# Patient Record
Sex: FEMALE | Race: BLACK OR AFRICAN AMERICAN | NOT HISPANIC OR LATINO | Employment: OTHER | URBAN - METROPOLITAN AREA
[De-identification: names, ages, dates, MRNs, and addresses within clinical notes are randomized per-mention and may not be internally consistent; named-entity substitution may affect disease eponyms.]

---

## 2017-01-16 ENCOUNTER — HISTORICAL (OUTPATIENT)
Dept: ADMINISTRATIVE | Facility: HOSPITAL | Age: 57
End: 2017-01-16

## 2017-03-31 ENCOUNTER — HISTORICAL (OUTPATIENT)
Dept: LAB | Facility: HOSPITAL | Age: 57
End: 2017-03-31

## 2018-01-23 ENCOUNTER — HISTORICAL (OUTPATIENT)
Dept: ADMINISTRATIVE | Facility: HOSPITAL | Age: 58
End: 2018-01-23

## 2018-04-05 ENCOUNTER — HISTORICAL (OUTPATIENT)
Dept: LAB | Facility: HOSPITAL | Age: 58
End: 2018-04-05

## 2018-04-05 LAB
ABS NEUT (OLG): 4.33 X10(3)/MCL (ref 2.1–9.2)
ALBUMIN SERPL-MCNC: 3.7 GM/DL (ref 3.4–5)
ALBUMIN/GLOB SERPL: 1 {RATIO}
ALP SERPL-CCNC: 71 UNIT/L (ref 45–117)
ALT SERPL-CCNC: 26 UNIT/L (ref 13–56)
APPEARANCE, UA: CLEAR
AST SERPL-CCNC: 11 UNIT/L (ref 15–37)
BILIRUB SERPL-MCNC: 0.2 MG/DL (ref 0.2–1)
BILIRUB UR QL STRIP: NEGATIVE
BILIRUBIN DIRECT+TOT PNL SERPL-MCNC: <0.1 MG/DL (ref 0–0.2)
BILIRUBIN DIRECT+TOT PNL SERPL-MCNC: >0.1 MG/DL (ref 0–1)
BUN SERPL-MCNC: 10 MG/DL (ref 7–18)
CALCIUM SERPL-MCNC: 8.5 MG/DL (ref 8.5–10.1)
CHLORIDE SERPL-SCNC: 109 MMOL/L (ref 98–107)
CHOLEST SERPL-MCNC: 198 MG/DL (ref 0–200)
CHOLEST/HDLC SERPL: 3.5 {RATIO} (ref 0–4)
CO2 SERPL-SCNC: 29 MMOL/L (ref 21–32)
COLOR UR: YELLOW
CREAT SERPL-MCNC: 0.81 MG/DL (ref 0.55–1.02)
ERYTHROCYTE [DISTWIDTH] IN BLOOD BY AUTOMATED COUNT: 12.6 % (ref 11.5–17)
GLOBULIN SER-MCNC: 4 GM/DL (ref 2–4)
GLUCOSE (UA): NEGATIVE
GLUCOSE SERPL-MCNC: 103 MG/DL (ref 74–106)
HCT VFR BLD AUTO: 40.7 % (ref 37–47)
HDLC SERPL-MCNC: 57 MG/DL (ref 35–60)
HGB BLD-MCNC: 13.3 GM/DL (ref 12–16)
HGB UR QL STRIP: NEGATIVE
KETONES UR QL STRIP: NEGATIVE
LDLC SERPL CALC-MCNC: 115 MG/DL (ref 0–129)
LEUKOCYTE ESTERASE UR QL STRIP: NEGATIVE
MCH RBC QN AUTO: 31.9 PG (ref 27–31)
MCHC RBC AUTO-ENTMCNC: 32.7 GM/DL (ref 33–36)
MCV RBC AUTO: 97.6 FL (ref 80–94)
NITRITE UR QL STRIP: NEGATIVE
PH UR STRIP: 5.5 [PH] (ref 5–7)
PLATELET # BLD AUTO: 295 X10(3)/MCL (ref 130–400)
PMV BLD AUTO: 9.7 FL (ref 7.4–10.4)
POTASSIUM SERPL-SCNC: 4.2 MMOL/L (ref 3.5–5.1)
PROT SERPL-MCNC: 7.3 GM/DL (ref 6.4–8.2)
PROT UR QL STRIP: NEGATIVE
RBC # BLD AUTO: 4.17 X10(6)/MCL (ref 4.2–5.4)
SODIUM SERPL-SCNC: 144 MMOL/L (ref 136–145)
SP GR UR STRIP: >=1.03 (ref 1–1.03)
TRIGL SERPL-MCNC: 132 MG/DL (ref 30–150)
TSH SERPL-ACNC: 1.8 MIU/ML (ref 0.36–3.74)
UROBILINOGEN UR STRIP-ACNC: NEGATIVE
VLDLC SERPL CALC-MCNC: 26 MG/DL
WBC # SPEC AUTO: 8.2 X10(3)/MCL (ref 4.5–11.5)

## 2018-04-24 ENCOUNTER — HISTORICAL (OUTPATIENT)
Dept: ADMINISTRATIVE | Facility: HOSPITAL | Age: 58
End: 2018-04-24

## 2022-04-10 ENCOUNTER — HISTORICAL (OUTPATIENT)
Dept: ADMINISTRATIVE | Facility: HOSPITAL | Age: 62
End: 2022-04-10

## 2022-04-30 VITALS
WEIGHT: 147.69 LBS | SYSTOLIC BLOOD PRESSURE: 127 MMHG | WEIGHT: 147.69 LBS | HEIGHT: 66 IN | DIASTOLIC BLOOD PRESSURE: 81 MMHG | BODY MASS INDEX: 23.74 KG/M2 | BODY MASS INDEX: 27.58 KG/M2 | WEIGHT: 171.63 LBS | HEIGHT: 66 IN | BODY MASS INDEX: 23.74 KG/M2 | HEIGHT: 66 IN

## 2022-05-02 NOTE — HISTORICAL [facility] CERNER
This is a historical note converted from [employer]. Formatting and pictures may have been removed.  Please reference [employer] for original formatting and attached multimedia. 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 NO INJURY. PAIN TODAY 10/10. PT. REPORTS THAT SHE STILL HAS PAIN TO RIGHT FOOT. DENIES OF ANY PAIN TODAY.....SB  History of Present Illness  This is a 57-year-old female that saw my previous partner?and she comes in today with left shoulder pain. 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 ?She has a lot of pain with?doing things at work. ?This is moderate to severe.? The pain is over the anterolateral shoulder.  Review of Systems  ????GENERAL: No fevers, chills, unexpected weight loss or gain  ????MUSCULOSKELETAL: Joint pain, extremity pain  Physical Exam  Vitals & Measurements  HR:?100?(Peripheral)? BP:?127/81?  HT:?167?cm? HT:?167?cm? WT:?77.85?kg? WT:?77.85?kg? BMI:?27.91?  General: Well-developed, well-nourished.  Neuro: Alert and oriented x 3.  Psych: Normal mood and affect.  CV: Palpable radial pulses.  Resp: Smooth and unlabored.  Skin: No evidence of focal lesions or trauma.  Hem/Imm/Lymph: No evidence of lymphangitis or adenopathy.  Gait: No trendelenburg gait.  DTR: 2+, no hypo or hyperreflexia.  Coordination and Balance: No tremors or abnormal station.  Shoulder Exam:  No obvious deformity. No medial or lateral scapula winging. Forward flexion to 130. ?Positive?empty can, Whipple, Vasquez, impingement, + AC joint tenderness. Negative biceps groove tenderness, Roldan?s, Yergason?s, Speed test. Negative Apprehension and Relocation test. 4-/5 strength, normal skin appearance and palpable pulses distally. Sensibility normal.  Assessment/Plan  1.?Rotator cuff syndrome of left shoulder  ? We discussed multiple options. ?We will start conservatively today. ?I will  give her a shoulder injection and give her some home exercises.? I will see her back in 6-8 weeks. ?If she continues to have symptoms we may consider an MRI at that time.  Subacromial injection:  The posterior acromium was prepped and draped in normal sterile fashion. Just inferior to the posterior acromium a 25-gauge needle was inserted into the subacromial space. A solution with 1cc of 40mg depomedrol and 2cc of 0.5% Marcaine was injected into the subacromial space. Patient tolerated procedure well without any complications.  Ordered:  asp/inj jnt/bursa, major [id number] PC, 18 17:23:00 CST, [facility] AMB - Ortho Surg SW, Routine, 18 17:23:00 CST  Office/Outpatient Visit Level 4 Established [id number] PC, Rotator cuff syndrome of left shoulder, [facility] - Ortho Surg SW, 18 11:55:00 CST  ?   Problem List/Past Medical History  Ongoing  Bladder problem  Bladder spasm  Family history of cardiovascular disease  Morbid obesity  Historical  Procedure/Surgical History   section   section  Cholecystectomy  Hernia 2016 23:35:49<$>  Hernia 2016 23:35:49<$>  Medications  aspirin 81 mg oral tablet, 81 mg= 1 tab(s), Oral, Daily  biotin 5000 mcg oral tablet, disintegrating, 1 tab(s), Oral, Daily  CeleBREX 200 mg oral capsule, 200 mg= 1 cap(s), Oral, Daily  Cutivate 0.05% topical cream, 1 mckenzie, TOP, BID, 1 refills  FUROSEMIDE 20 MG TABLET, 20 mg= 1 tab(s), Oral, Daily,? ?Not taking  MEDROXYPROGESTERONE 2.5 MG TAB, 2.5 mg= 1 tab(s), Oral, Daily  MELOXICAM 15 MG TABLET, 15 mg= 1 tab(s), Oral, Daily,? ?Not taking  MIMVEY LO 0.5-0.1 MG TABLET, 1 tab(s), Oral, Daily  multivitamin with minerals (Adult Tab), 1 tab(s), Oral, Daily  Myrbetriq 50 mg oral tablet, extended release, 50 mg= 1 tab(s), Oral, Daily  tamsulosin 0.4 mg oral capsule, 0.4 mg= 1 cap(s), Oral, Daily,? ?Not taking  Vitamin B12 2500 mcg sublingual tablet, 2500 mcg= 1 tab(s), SL, Daily  Allergies  No Known Medication Allergies  Social  History  Alcohol - Denies Alcohol Use, 07/15/2016  Never  Employment/School - 07/15/2016  Employed, Work/School description: / .  Exercise - 07/15/2016  Exercise duration: 30. Exercise frequency: 3-4 times/week.  Home/Environment - 07/15/2016  Lives with Spouse. Living situation: Home/Independent.  Nutrition/Health - 07/15/2016  Regular  Sexual - 07/15/2016  Sexually active: Yes.  Substance Abuse - 07/15/2016  Never  Tobacco - Denies Tobacco Use, 07/15/2016  Never smoker  Family History  Alzheimer disease: Negative: Father.  Blood clot: Mother.  Breast cancer: Sister.  Coronary artery disease: Father.  Diabetes mellitus type 2: Father.  Heart attack.: Father.  Hypertension.: Mother.  Stroke: Mother.

## 2022-05-23 NOTE — HISTORICAL [facility] CERNER
This is a historical note converted from [employer]. Formatting and pictures may have been removed.  Please reference [employer] for original formatting and attached multimedia. Chief Complaint  PT HERE TODAY TO F/U ON LEFT SHOULDER, PT WOULD LIKE AN INJECTION. LAST INJECTION WAS ON 01/23/18. PT ALSO C/O LEFT KNEE PAIN. X-RAYS DONE TODAY......CV  History of Present Illness  This is a 57-year-old female that saw my previous partner?and she comes in today with left shoulder pain. 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 ?She has a lot of pain with?doing things at work. ?This is moderate to severe.? The pain is over the anterolateral shoulder. [1]  3/13/2018 this patient comes in today after her steroid injection states she feels amazingly better. ?She has no pain. [1]  4/20/2014 this patient comes in today complaining of continued left shoulder pain after her previous injection?and also left knee pain.? She states the knee?feels stiff in the morning and also has some night pain with that knee.  Review of Systems  GENERAL: No fevers, chills, unexpected weight loss or gain  ?????MUSCULOSKELETAL: Joint pain, extremity pain [2]  Physical Exam  Vitals & Measurements  HT:?167?cm? HT:?167?cm? WT:?67?kg? WT:?67?kg? BMI:?24.02?  General: Well-developed, well-nourished.  Neuro: Alert and oriented x 3.  Psych: Normal mood and affect.  CV: Palpable radial pulses.  Resp: Smooth and unlabored.  Skin: No evidence of focal lesions or trauma.  Hem/Imm/Lymph: No evidence of lymphangitis or adenopathy.  Gait: No trendelenburg gait.  DTR: 2+, no hypo or hyperreflexia.  Coordination and Balance: No tremors or abnormal station.  Shoulder Exam:  No obvious deformity. No medial or lateral scapula winging. Forward flexion to 130. ?Positive?empty can, Whipple, Vasquez, impingement, + AC joint tenderness. Negative biceps groove tenderness, Roldan?s, Yergason?s, Speed test. Negative  Apprehension and Relocation test. 4-/5 strength, normal skin appearance and palpable pulses distally. Sensibility normal. [3]  Knee Exam:  Varus deformity. Range of motion from 5-120 degrees. Negative patella grind and equal subluxation of knee cap medial and lateral < 1cm. Negative patella tendon tenderness. Negative Lachman and anterior drawer test. Negative posterior drawer test. Negative varus and valgus stress test. Positive medial joint line tenderness. Negative lateral joint line tenderness. 4/5 strength and normal skin appearance. Sensibility normal.  Assessment/Plan  1.?Rotator cuff syndrome of left shoulder  ? Patient received a left?subacromial shoulder injection today.??We may obtain an MRI of the future if her symptoms continue.  Subacromial?shoulder injection:  The posterior acromium was prepped and draped in normal sterile fashion. Just inferior to the posterior acromium a 25-gauge needle was inserted into the subacromial space. A solution with 1cc of 40mg depomedrol and 2cc of 0.5% Marcaine was injected into the subacromial space. Patient tolerated procedure well without any complications.  Ordered:  methylPREDNISolone, 40 mg, Intra-Articular, Once, first dose 04/24/18 17:14:00 CDT, stop date 04/24/18 17:14:00 CDT, NOW  asp/inj jnt/bursa, major [id number] PC, 04/24/18 17:14:00 CDT, MD AMB - Ortho Surg , Routine, 04/24/18 17:14:00 CDT  Office/Outpatient Visit Level 4 Established [id number] PC, Rotator cuff syndrome of left shoulder  Osteoarthritis of left knee, [facility] - Ortho Surg , 04/24/18 17:14:00 CDT  ?  2.?Osteoarthritis of left knee  ? We discussed multiple options for her knee. ?We will proceed with a left knee?Synvisc injection after we get approval.  Ordered:  Office/Outpatient Visit Level 4 Established [id number] PC, Rotator cuff syndrome of left shoulder  Osteoarthritis of left knee, [facility] - Ortho Surg , 04/24/18 17:14:00 CDT  ?  Pain in left knee  ?   Problem List/Past Medical  History  Ongoing  Bladder problem  Bladder spasm  Family history of cardiovascular disease  Morbid obesity  Rotator cuff syndrome of left shoulder  Historical  No qualifying data  Procedure/Surgical History   section,  section, Cholecystectomy, Hernia 2016 23:35:49<$>, Hernia 2016 23:35:49<$>.  Medications  aspirin 81 mg oral tablet, 81 mg= 1 tab(s), Oral, Daily  biotin 5000 mcg oral tablet, disintegrating, 1 tab(s), Oral, Daily  CeleBREX 200 mg oral capsule, 200 mg= 1 cap(s), Oral, Daily  Cutivate 0.05% topical cream, 1 mckenzie, TOP, BID, 1 refills  Depo-Medrol 40 mg/mL injectable suspension, 40 mg, Intra-Articular, Once  FUROSEMIDE 20 MG TABLET, 20 mg= 1 tab(s), Oral, Daily,? ?Not taking  MEDROXYPROGESTERONE 2.5 MG TAB, 2.5 mg= 1 tab(s), Oral, Daily  MELOXICAM 15 MG TABLET, 15 mg= 1 tab(s), Oral, Daily,? ?Not taking  MIMVEY LO 0.5-0.1 MG TABLET, 1 tab(s), Oral, Daily  multivitamin with minerals (Adult Tab), 1 tab(s), Oral, Daily  Myrbetriq 50 mg oral tablet, extended release, 50 mg= 1 tab(s), Oral, Daily  tamsulosin 0.4 mg oral capsule, 0.4 mg= 1 cap(s), Oral, Daily,? ?Not taking  Vitamin B12 2500 mcg sublingual tablet, 2500 mcg= 1 tab(s), SL, Daily  Allergies  No Known Medication Allergies  Social History  Alcohol - Denies Alcohol Use, 2016  Never, 07/15/2016  Employment/School  Employed, Work/School description: / ., 07/15/2016  Exercise  Exercise duration: 30. Exercise frequency: 3-4 times/week., 07/15/2016  Home/Environment  Lives with Spouse. Living situation: Home/Independent., 07/15/2016  Nutrition/Health  Regular, 07/15/2016  Sexual  Sexually active: Yes., 07/15/2016  Substance Abuse  Never, 07/15/2016  Tobacco - Denies Tobacco Use, 2016  Never smoker, 2016  Family History  Alzheimer disease: Negative: Father.  Blood clot: Mother.  Breast cancer: Sister.  Coronary artery disease: Father.  Diabetes mellitus type 2: Father.  Heart  attack.: Father.  Hypertension.: Mother.  Stroke: Mother.  Immunizations  Vaccine Date Status   pneumococcal 23-polyvalent vaccine 09/20/2016 Given   influenza virus vaccine, inactivated 09/20/2016 Given   tetanus/diphth/pertuss (Tdap) adult/adol 2011 Recorded      [1]?Office Visit Note; Dante Carter MD 03/13/2018 16:56 CDT  [2]?Office Visit Note; Dante Carter MD 03/13/2018 16:56 CDT  [3]?Office Visit Note; Dante Carter MD 03/13/2018 16:56 CDT   General Sunscreen Counseling: I recommended a broad spectrum sunscreen with a SPF of 30 or higher.  I explained that SPF 30 sunscreens block approximately 97 percent of the sun's harmful rays.  Sunscreens should be applied at least 15 minutes prior to expected sun exposure and then every 2 hours after that as long as sun exposure continues. If swimming or exercising sunscreen should be reapplied every 45 minutes to an hour after getting wet or sweating.  One ounce, or the equivalent of a shot glass full of sunscreen, is adequate to protect the skin not covered by a bathing suit. I also recommended a lip balm with a sunscreen as well. Sun protective clothing can be used in lieu of sunscreen but must be worn the entire time you are exposed to the sun's rays. Products Recommended: Elta MD UV Clear or Daily Detail Level: Generalized

## 2022-06-08 ENCOUNTER — LAB VISIT (OUTPATIENT)
Dept: LAB | Facility: HOSPITAL | Age: 62
End: 2022-06-08
Attending: INTERNAL MEDICINE
Payer: COMMERCIAL

## 2022-06-08 DIAGNOSIS — Z86.010 PERSONAL HISTORY OF COLONIC POLYPS: Primary | ICD-10-CM

## 2022-06-08 LAB
ALBUMIN SERPL-MCNC: 3.8 GM/DL (ref 3.5–5)
ALBUMIN/GLOB SERPL: 1.3 RATIO (ref 1.1–2)
ALP SERPL-CCNC: 51 UNIT/L (ref 40–150)
ALT SERPL-CCNC: 15 UNIT/L (ref 0–55)
AST SERPL-CCNC: 18 UNIT/L (ref 5–34)
BASOPHILS # BLD AUTO: 0.02 X10(3)/MCL (ref 0–0.2)
BASOPHILS NFR BLD AUTO: 0.3 %
BILIRUBIN DIRECT+TOT PNL SERPL-MCNC: 0.5 MG/DL
BUN SERPL-MCNC: 9.5 MG/DL (ref 9.8–20.1)
CALCIUM SERPL-MCNC: 9.2 MG/DL (ref 8.4–10.2)
CHLORIDE SERPL-SCNC: 109 MMOL/L (ref 98–107)
CO2 SERPL-SCNC: 29 MMOL/L (ref 22–29)
CREAT SERPL-MCNC: 0.85 MG/DL (ref 0.55–1.02)
EOSINOPHIL # BLD AUTO: 0.21 X10(3)/MCL (ref 0–0.9)
EOSINOPHIL NFR BLD AUTO: 3.6 %
ERYTHROCYTE [DISTWIDTH] IN BLOOD BY AUTOMATED COUNT: 13 % (ref 11.5–17)
GLOBULIN SER-MCNC: 3 GM/DL (ref 2.4–3.5)
GLUCOSE SERPL-MCNC: 104 MG/DL (ref 74–100)
HCT VFR BLD AUTO: 38 % (ref 37–47)
HGB BLD-MCNC: 12.2 GM/DL (ref 12–16)
IMM GRANULOCYTES # BLD AUTO: 0.01 X10(3)/MCL (ref 0–0.02)
IMM GRANULOCYTES NFR BLD AUTO: 0.2 % (ref 0–0.43)
LYMPHOCYTES # BLD AUTO: 2.56 X10(3)/MCL (ref 0.6–4.6)
LYMPHOCYTES NFR BLD AUTO: 43.5 %
MCH RBC QN AUTO: 31.4 PG (ref 27–31)
MCHC RBC AUTO-ENTMCNC: 32.1 MG/DL (ref 33–36)
MCV RBC AUTO: 97.9 FL (ref 80–94)
MONOCYTES # BLD AUTO: 0.52 X10(3)/MCL (ref 0.1–1.3)
MONOCYTES NFR BLD AUTO: 8.8 %
NEUTROPHILS # BLD AUTO: 2.6 X10(3)/MCL (ref 2.1–9.2)
NEUTROPHILS NFR BLD AUTO: 43.6 %
NRBC BLD AUTO-RTO: 0 %
PLATELET # BLD AUTO: 252 X10(3)/MCL (ref 130–400)
PMV BLD AUTO: 9.9 FL (ref 9.4–12.4)
POTASSIUM SERPL-SCNC: 4.2 MMOL/L (ref 3.5–5.1)
PROT SERPL-MCNC: 6.8 GM/DL (ref 6.4–8.3)
RBC # BLD AUTO: 3.88 X10(6)/MCL (ref 4.2–5.4)
SODIUM SERPL-SCNC: 145 MMOL/L (ref 136–145)
WBC # SPEC AUTO: 5.9 X10(3)/MCL (ref 4.5–11.5)

## 2022-06-08 PROCEDURE — 80053 COMPREHEN METABOLIC PANEL: CPT

## 2022-06-08 PROCEDURE — 85025 COMPLETE CBC W/AUTO DIFF WBC: CPT

## 2022-06-08 PROCEDURE — 36415 COLL VENOUS BLD VENIPUNCTURE: CPT

## 2023-06-14 PROBLEM — Z84.89 FAMILY HISTORY OF NEOPLASM OF BREAST: Status: ACTIVE | Noted: 2023-06-14

## 2023-06-14 PROBLEM — Z01.419 WELL WOMAN EXAM: Status: ACTIVE | Noted: 2023-06-14

## 2023-07-26 ENCOUNTER — LAB VISIT (OUTPATIENT)
Dept: LAB | Facility: HOSPITAL | Age: 63
End: 2023-07-26
Attending: INTERNAL MEDICINE
Payer: MEDICAID

## 2023-07-26 DIAGNOSIS — R07.2 PRECORDIAL PAIN: Primary | ICD-10-CM

## 2023-07-26 DIAGNOSIS — I10 HYPERTENSION, UNSPECIFIED TYPE: ICD-10-CM

## 2023-07-26 DIAGNOSIS — I20.9 ANGINAL SYNDROME: ICD-10-CM

## 2023-07-26 LAB
ALBUMIN SERPL-MCNC: 3.7 G/DL (ref 3.4–4.8)
ALBUMIN/GLOB SERPL: 1.4 RATIO (ref 1.1–2)
ALP SERPL-CCNC: 77 UNIT/L (ref 40–150)
ALT SERPL-CCNC: 35 UNIT/L (ref 0–55)
AST SERPL-CCNC: 23 UNIT/L (ref 5–34)
BILIRUBIN DIRECT+TOT PNL SERPL-MCNC: 1.1 MG/DL
BUN SERPL-MCNC: 8.7 MG/DL (ref 9.8–20.1)
CALCIUM SERPL-MCNC: 9.2 MG/DL (ref 8.4–10.2)
CHLORIDE SERPL-SCNC: 108 MMOL/L (ref 98–107)
CHOLEST SERPL-MCNC: 153 MG/DL
CHOLEST/HDLC SERPL: 2 {RATIO} (ref 0–5)
CO2 SERPL-SCNC: 26 MMOL/L (ref 23–31)
CREAT SERPL-MCNC: 0.83 MG/DL (ref 0.55–1.02)
ERYTHROCYTE [DISTWIDTH] IN BLOOD BY AUTOMATED COUNT: 13.9 % (ref 11.5–17)
EST. AVERAGE GLUCOSE BLD GHB EST-MCNC: 122.6 MG/DL
GFR SERPLBLD CREATININE-BSD FMLA CKD-EPI: >60 MLS/MIN/1.73/M2
GLOBULIN SER-MCNC: 2.7 GM/DL (ref 2.4–3.5)
GLUCOSE SERPL-MCNC: 118 MG/DL (ref 82–115)
HBA1C MFR BLD: 5.9 %
HCT VFR BLD AUTO: 40.6 % (ref 37–47)
HDLC SERPL-MCNC: 62 MG/DL (ref 35–60)
HGB BLD-MCNC: 13.2 G/DL (ref 12–16)
LDLC SERPL CALC-MCNC: 69 MG/DL (ref 50–140)
MCH RBC QN AUTO: 31.5 PG (ref 27–31)
MCHC RBC AUTO-ENTMCNC: 32.5 G/DL (ref 33–36)
MCV RBC AUTO: 96.9 FL (ref 80–94)
NRBC BLD AUTO-RTO: 0 %
PLATELET # BLD AUTO: 262 X10(3)/MCL (ref 130–400)
PMV BLD AUTO: 10.7 FL (ref 7.4–10.4)
POTASSIUM SERPL-SCNC: 4.1 MMOL/L (ref 3.5–5.1)
PROT SERPL-MCNC: 6.4 GM/DL (ref 5.8–7.6)
RBC # BLD AUTO: 4.19 X10(6)/MCL (ref 4.2–5.4)
SODIUM SERPL-SCNC: 141 MMOL/L (ref 136–145)
TRIGL SERPL-MCNC: 111 MG/DL (ref 37–140)
TSH SERPL-ACNC: 2.03 UIU/ML (ref 0.35–4.94)
VLDLC SERPL CALC-MCNC: 22 MG/DL
WBC # SPEC AUTO: 10.42 X10(3)/MCL (ref 4.5–11.5)

## 2023-07-26 PROCEDURE — 83036 HEMOGLOBIN GLYCOSYLATED A1C: CPT

## 2023-07-26 PROCEDURE — 84443 ASSAY THYROID STIM HORMONE: CPT

## 2023-07-26 PROCEDURE — 36415 COLL VENOUS BLD VENIPUNCTURE: CPT

## 2023-07-26 PROCEDURE — 80053 COMPREHEN METABOLIC PANEL: CPT

## 2023-07-26 PROCEDURE — 80061 LIPID PANEL: CPT

## 2023-07-26 PROCEDURE — 85027 COMPLETE CBC AUTOMATED: CPT

## 2024-03-11 ENCOUNTER — OFFICE VISIT (OUTPATIENT)
Dept: HEMATOLOGY/ONCOLOGY | Facility: CLINIC | Age: 64
End: 2024-03-11
Payer: MEDICAID

## 2024-03-11 DIAGNOSIS — Z80.3 FAMILY HISTORY OF BREAST CANCER: Primary | ICD-10-CM

## 2024-03-11 DIAGNOSIS — Z80.42 FAMILY HISTORY OF PROSTATE CANCER: ICD-10-CM

## 2024-03-11 PROCEDURE — 99205 OFFICE O/P NEW HI 60 MIN: CPT | Mod: 95,,, | Performed by: NURSE PRACTITIONER

## 2024-03-11 PROCEDURE — 4010F ACE/ARB THERAPY RXD/TAKEN: CPT | Mod: CPTII,95,, | Performed by: NURSE PRACTITIONER

## 2024-03-11 NOTE — PROGRESS NOTES
REFERRING PROVIDER:       Patient ID: Claudette M Derouen is a 63 y.o. female.    Chief Complaint: Personal history of colon cancer (contained in polyp) dx61y, and a family history of cancer. Patient presented via Telemedicine Visit (audio only) today for risk assessment, genetic counseling, and consideration for genetic testing.    HPI  Past Medical History:   Diagnosis Date    Hx of blood clots     Hypertension         Past Surgical History:   Procedure Laterality Date    bladder botox       SECTION      FETAL SURGERY FOR CONGENITAL HERNIA          Review of patient's allergies indicates:  Patient has no known allergies.     Review of Systems        Problem List Items Addressed This Visit    None       Oncology History    No history exists.      Family History   Problem Relation Age of Onset    Stroke Mother     Heart attack Father     Breast cancer Sister 36    Breast cancer Sister 34    Prostate cancer Brother     Prostate cancer Brother         metastatic    Stomach cancer Paternal Aunt             Assessment:   Risk Assessment:  This patient is at increased risk of having an inherited genetic mutation that increases the risk for cancer. Patient meets criteria for genetic testing based on the National Comprehensive Cancer Network (NCCN) criteria due to a personal history of colon cancer and a family history that includes breast cancer diagnosed under 50y (sister dx36y, sister dx34y) and prostate cancer (brother dx70y, brother-metastatic d56y) (see family history and pedigree). Based on the likelihood of having a mutation, BRCA1/2 Analysis with [facility] CancerNext-Expanded+RNAinsight panel testing was described in detail.    Education and Counseling:  [facility] CancerNext-Expanded evaluates a broad number of hereditary cancer syndromes to help define patients' cancer risk. This cancer panel tests (77 total): AIP, ALK, APC*, BAY*, AXIN2, BAP1, BARD1, BLM, BMPR1A, BRCA1*, BRCA2*, BRIP1*,  CDC73, CDH1*, CDK4, CDKN1B, CDKN2A, CHEK2*, CTNNA1, DICER1, FANCC, FH, FLCN, GALNT12, KIF1B, LZTR1, MAX, MEN1, MET, MLH1*, MSH2*, MSH3, MSH6*, MUTYH*, NBN, NF1*, NF2, NTHL1, PALB2*, PHOX2B, PMS2*, POT1, JCOAS0Y, PTCH1, PTEN*, RAD51C*, RAD51D*, RB1, RECQL, RET, SDHA, SDHAF2, SDHB, SDHC, SDHD, SMAD4, SMARCA4, SMARCB1, SMARCE1, STK11, SUFU, [id number], TP53*, TSC1, TSC2, VHL and XRCC2 (sequencing and deletion/duplication); EGFR, EGLN1, HOXB13, KIT, MITF, PDGFRA, POLD1 and POLE (sequencing only); EPCAM and GREM1 (deletion/duplication only). DNA and RNA analyses performed for * genes.    Risks of cancer associated with inherited cancer predisposition mutations were discussed in detail.  If a mutation were found, this patient would have a significantly increased risk for cancer.  Inherited cancer syndromes included in this test, may have different, but still significant risk for cancer.  Risk of cancer with any particular gene mutation will be discussed at the time of results disclosure and based on the results.    The availability of clinical management options for inherited cancer predisposition mutation carriers was discussed, including increased surveillance, chemoprevention, and prophylactic surgery. Details of the testing process, including benefits and limitations of genetic analysis as well as the implications of possible test results, were discussed.  Because this patient is the first member of the family to be tested comprehensive panel testing was presented.  Related insurance issues were discussed.      Summary:  This patient was evaluated for hereditary risk of cancer and was found to be at an increased risk of having an inherited cancer predisposition gene mutation.  The option of genetic testing was explained in detail, including the possible impact of this information on family members.  Since this patient wishes to proceed with testing an informed consent was obtained and blood drawn and sent to [facility].  Results will be expected 4 weeks from this time.  A follow-up appointment will be scheduled for results disclosure.       The patient location is: home.     Visit type: audio only    Time with patient: 40 minutes  >60 minutes of total time spent on the encounter, which includes face to face time and non-face to face time preparing to see the patient (eg, review of tests), Obtaining and/or reviewing separately obtained history, Documenting clinical information in the electronic or other health record, Independently interpreting results (not separately reported) and communicating results to the patient/family/caregiver, or Care coordination (not separately reported).       Each patient to whom he or she provides medical services by telemedicine is:  (1) informed of the relationship between the physician and patient and the respective role of any other health care provider with respect to management of the patient; and (2) notified that he or she may decline to receive medical services by telemedicine and may withdraw from such care at any time.    VILMA GRESHAM, PhD

## 2024-04-15 ENCOUNTER — OFFICE VISIT (OUTPATIENT)
Dept: HEMATOLOGY/ONCOLOGY | Facility: CLINIC | Age: 64
End: 2024-04-15
Payer: MEDICAID

## 2024-04-15 DIAGNOSIS — Z80.3 FAMILY HISTORY OF BREAST CANCER: Primary | ICD-10-CM

## 2024-04-15 DIAGNOSIS — Z80.42 FAMILY HISTORY OF PROSTATE CANCER: ICD-10-CM

## 2024-04-15 PROCEDURE — 4010F ACE/ARB THERAPY RXD/TAKEN: CPT | Mod: CPTII,95,, | Performed by: NURSE PRACTITIONER

## 2024-04-15 PROCEDURE — 99213 OFFICE O/P EST LOW 20 MIN: CPT | Mod: 95,,, | Performed by: NURSE PRACTITIONER

## 2024-04-15 NOTE — PROGRESS NOTES
REFERRING PROVIDER:    Subjective:       Patient ID: Claudette M Derouen is a 63 y.o. female.    Chief Complaint: Personal history of colon cancer (contained in polyp) dx61y, and a family history of cancer. Patient presented for genetic counseling on 3/11/2024 and was found appropriate for genetic testing based on the National Comprehensive Cancer Network (NCCN) criteria due to a personal history of colon cancer and a family history that includes breast cancer diagnosed under 50y (sister dx36y, sister dx34y) and prostate cancer (brother dx70y, brother-metastatic d56y) (see family history and pedigree). Patient signed consent, lab was drawn and sent to [facility] for CancerNext-Expanded+RNAinsight panel testing. Patient presented via Telemedicine Visit (audio and video) today for results disclosure.     HPI  Past Medical History:   Diagnosis Date    Hx of blood clots     Hypertension         Past Surgical History:   Procedure Laterality Date    bladder botox       SECTION      FETAL SURGERY FOR CONGENITAL HERNIA          Review of patient's allergies indicates:  No Known Allergies     Review of Systems            Problem List Items Addressed This Visit    None    Oncology History    No history exists.            Family History   Problem Relation Name Age of Onset    Stroke Mother      Heart attack Father      Breast cancer Sister  36    Breast cancer Sister  34    Prostate cancer Brother      Prostate cancer Brother          metastatic    Stomach cancer Paternal Aunt          Assessment:   Genetic testing was appropriate for this patient because of personal and family history. This comprehensive [facility] CancerNext-Expanded analysis indicated that there was no deleterious mutation and no variants of uncertain significance found in (71 total): AIP, ALK, APC, BAY, BAP1, BARD1, BMPR1A, BRCA1, BRCA2, BRIP1, CDC73, CDH1, CDK4, CDKN1B, CDKN2A, CHEK2, DICER1, FH, FLCN, KIF1B, LZTR1, MAX, MEN1, MET,  MLH1, MSH2, MSH6, MUTYH, NF1, NF2, NTHL1, PALB2, PHOX2B, PMS2, POT1, EPGBJ7A, PTCH1, PTEN, RAD51C, RAD51D, RB1, RET, SDHA, SDHAF2, SDHB, SDHC, SDHD, SMAD4, SMARCA4, SMARCB1, SMARCE1, STK11, SUFU, [id number], TP53, TSC1, TSC2 and VHL (sequencing and deletion/duplication); AXIN2, CTNNA1, EGFR, EGLN1, HOXB13, KIT, MITF, MSH3, PDGFRA, POLD1 and POLE (sequencing only); EPCAM and GREM1 (deletion/duplication only). RNA data is routinely analyzed for use in variant interpretation for all genes.     It was explained to the patient, that, although this analysis indicated a negative result, there are other, rare genetic abnormalities that this test will not detect. This result, however, rules out the majority of abnormalities believed to be responsible for hereditary susceptibility to cancer.    A copy of the result will be emailed to the patient: [email or web address].    The patient location is: home    Visit type: audio only    Time with patient: 10 minutes  20 minutes of total time spent on the encounter, which includes face to face time and non-face to face time preparing to see the patient (eg, review of tests), Obtaining and/or reviewing separately obtained history, Documenting clinical information in the electronic or other health record, Independently interpreting results (not separately reported) and communicating results to the patient/family/caregiver, or Care coordination (not separately reported).         Each patient to whom he or she provides medical services by telemedicine is:  (1) informed of the relationship between the physician and patient and the respective role of any other health care provider with respect to management of the patient; and (2) notified that he or she may decline to receive medical services by telemedicine and may withdraw from such care at any time.    VILMA GRESHAM, PhD

## 2024-08-08 DIAGNOSIS — M54.16 LUMBAR RADICULOPATHY: Primary | ICD-10-CM

## 2025-02-28 ENCOUNTER — LAB VISIT (OUTPATIENT)
Dept: LAB | Facility: HOSPITAL | Age: 65
End: 2025-02-28
Attending: INTERNAL MEDICINE
Payer: MEDICAID

## 2025-02-28 DIAGNOSIS — R93.1 ABNORMAL ECHOCARDIOGRAM: ICD-10-CM

## 2025-02-28 DIAGNOSIS — R94.31 NONSPECIFIC ABNORMAL ELECTROCARDIOGRAM (ECG) (EKG): Primary | ICD-10-CM

## 2025-02-28 LAB
ALBUMIN SERPL-MCNC: 3.8 G/DL (ref 3.4–4.8)
ALBUMIN/GLOB SERPL: 1.4 RATIO (ref 1.1–2)
ALP SERPL-CCNC: 62 UNIT/L (ref 40–150)
ALT SERPL-CCNC: 46 UNIT/L (ref 0–55)
ANION GAP SERPL CALC-SCNC: 9 MEQ/L
AST SERPL-CCNC: 30 UNIT/L (ref 5–34)
BILIRUB SERPL-MCNC: 0.7 MG/DL
BUN SERPL-MCNC: 11.7 MG/DL (ref 9.8–20.1)
CALCIUM SERPL-MCNC: 9.6 MG/DL (ref 8.4–10.2)
CHLORIDE SERPL-SCNC: 109 MMOL/L (ref 98–107)
CHOLEST SERPL-MCNC: 175 MG/DL
CHOLEST/HDLC SERPL: 2 {RATIO} (ref 0–5)
CO2 SERPL-SCNC: 24 MMOL/L (ref 23–31)
CREAT SERPL-MCNC: 0.77 MG/DL (ref 0.55–1.02)
CREAT/UREA NIT SERPL: 15
ERYTHROCYTE [DISTWIDTH] IN BLOOD BY AUTOMATED COUNT: 13.7 % (ref 11.5–17)
EST. AVERAGE GLUCOSE BLD GHB EST-MCNC: 125.5 MG/DL
GFR SERPLBLD CREATININE-BSD FMLA CKD-EPI: >60 ML/MIN/1.73/M2
GLOBULIN SER-MCNC: 2.7 GM/DL (ref 2.4–3.5)
GLUCOSE SERPL-MCNC: 91 MG/DL (ref 82–115)
HBA1C MFR BLD: 6 %
HCT VFR BLD AUTO: 39.9 % (ref 37–47)
HDLC SERPL-MCNC: 79 MG/DL (ref 35–60)
HGB BLD-MCNC: 12.6 G/DL (ref 12–16)
LDLC SERPL CALC-MCNC: 82 MG/DL (ref 50–140)
MCH RBC QN AUTO: 31.8 PG (ref 27–31)
MCHC RBC AUTO-ENTMCNC: 31.6 G/DL (ref 33–36)
MCV RBC AUTO: 100.8 FL (ref 80–94)
NRBC BLD AUTO-RTO: 0 %
PLATELET # BLD AUTO: 321 X10(3)/MCL (ref 130–400)
PMV BLD AUTO: 9.7 FL (ref 7.4–10.4)
POTASSIUM SERPL-SCNC: 4.2 MMOL/L (ref 3.5–5.1)
PROT SERPL-MCNC: 6.5 GM/DL (ref 5.8–7.6)
RBC # BLD AUTO: 3.96 X10(6)/MCL (ref 4.2–5.4)
SODIUM SERPL-SCNC: 142 MMOL/L (ref 136–145)
TRIGL SERPL-MCNC: 69 MG/DL (ref 37–140)
VLDLC SERPL CALC-MCNC: 14 MG/DL
WBC # BLD AUTO: 9.21 X10(3)/MCL (ref 4.5–11.5)

## 2025-02-28 PROCEDURE — 80053 COMPREHEN METABOLIC PANEL: CPT

## 2025-02-28 PROCEDURE — 80061 LIPID PANEL: CPT

## 2025-02-28 PROCEDURE — 85027 COMPLETE CBC AUTOMATED: CPT

## 2025-02-28 PROCEDURE — 36415 COLL VENOUS BLD VENIPUNCTURE: CPT

## 2025-02-28 PROCEDURE — 83036 HEMOGLOBIN GLYCOSYLATED A1C: CPT
